# Patient Record
(demographics unavailable — no encounter records)

---

## 2024-12-03 NOTE — REASON FOR VISIT
[Annual Wellness Visit] : an annual wellness visit [FreeTextEntry1] : Annual wellness for 90 y/o male patient

## 2024-12-03 NOTE — HEALTH RISK ASSESSMENT
[Good] : ~his/her~  mood as  good [1 or 2 (0 pts)] : 1 or 2 (0 points) [Never (0 pts)] : Never (0 points) [No] : In the past 12 months have you used drugs other than those required for medical reasons? No [No falls in past year] : Patient reported no falls in the past year [Little interest or pleasure doing things] : 1) Little interest or pleasure doing things [Feeling down, depressed, or hopeless] : 2) Feeling down, depressed, or hopeless [0] : 2) Feeling down, depressed, or hopeless: Not at all (0) [PHQ-2 Negative - No further assessment needed] : PHQ-2 Negative - No further assessment needed [I have developed a follow-up plan documented below in the note.] : I have developed a follow-up plan documented below in the note. [Former] : Former [Patient declined colonoscopy] : Patient declined colonoscopy [None] : None [With Significant Other] : lives with significant other [Retired] : retired [] :  [# Of Children ___] : has [unfilled] children [Feels Safe at Home] : Feels safe at home [Fully functional (bathing, dressing, toileting, transferring, walking, feeding)] : Fully functional (bathing, dressing, toileting, transferring, walking, feeding) [Fully functional (using the telephone, shopping, preparing meals, housekeeping, doing laundry, using] : Fully functional and needs no help or supervision to perform IADLs (using the telephone, shopping, preparing meals, housekeeping, doing laundry, using transportation, managing medications and managing finances) [Reports normal functional visual acuity (ie: able to read med bottle)] : Reports normal functional visual acuity [Smoke Detector] : smoke detector [Carbon Monoxide Detector] : carbon monoxide detector [Safety elements used in home] : safety elements used in home [Seat Belt] :  uses seat belt [Sunscreen] : uses sunscreen [With Patient/Caregiver] : , with patient/caregiver [Reviewed no changes] : Reviewed, no changes [Designated Healthcare Proxy] : Designated healthcare proxy [Name: ___] : Health Care Proxy's Name: [unfilled]  [Relationship: ___] : Relationship: [unfilled] [Aggressive treatment] : aggressive treatment [I will adhere to the patient's wishes.] : I will adhere to the patient's wishes. [Time Spent: ___ minutes] : Time Spent: [unfilled] minutes [Intercurrent hospitalizations] : was admitted to the hospital  [Time Spent: ___ Minutes] : I spent [unfilled] minutes performing a depression screening for this patient. [NO] : No [de-identified] : In August he was admitted since he needed a aortic valve. He had an 80% blockage and was told he needed a stent. Ended up getting covid while at the hospital.  [de-identified] : Cardiac [Audit-CScore] : 0 [de-identified] : Walking [de-identified] : Standard diet [de-identified] :  Time Spent: Greater than 5 minutes [[id number]] : 0 [Change in mental status noted] : No change in mental status noted [Language] : denies difficulty with language [Behavior] : denies difficulty with behavior [Learning/Retaining New Information] : denies difficulty learning/retaining new information [Handling Complex Tasks] : denies difficulty handling complex tasks [Reasoning] : denies difficulty with reasoning [Spatial Ability and Orientation] : denies difficulty with spatial ability and orientation [Sexually Active] : not sexually active [Reports changes in hearing] : Reports no changes in hearing [Reports changes in vision] : Reports no changes in vision [Reports changes in dental health] : Reports no changes in dental health [Travel to Developing Areas] : does not  travel to developing areas [TB Exposure] : is not being exposed to tuberculosis [Caregiver Concerns] : does not have caregiver concerns [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

## 2024-12-03 NOTE — PHYSICAL EXAM
[No Acute Distress] : no acute distress [Well Nourished] : well nourished [Well Developed] : well developed [Well-Appearing] : well-appearing [Normal Sclera/Conjunctiva] : normal sclera/conjunctiva [PERRL] : pupils equal round and reactive to light [EOMI] : extraocular movements intact [Normal Outer Ear/Nose] : the outer ears and nose were normal in appearance [Normal Oropharynx] : the oropharynx was normal [No JVD] : no jugular venous distention [No Lymphadenopathy] : no lymphadenopathy [Supple] : supple [Thyroid Normal, No Nodules] : the thyroid was normal and there were no nodules present [No Respiratory Distress] : no respiratory distress  [No Accessory Muscle Use] : no accessory muscle use [Clear to Auscultation] : lungs were clear to auscultation bilaterally [Normal Rate] : normal rate  [Regular Rhythm] : with a regular rhythm [Normal S1, S2] : normal S1 and S2 [No Murmur] : no murmur heard [No Carotid Bruits] : no carotid bruits [No Abdominal Bruit] : a ~M bruit was not heard ~T in the abdomen [No Varicosities] : no varicosities [Pedal Pulses Present] : the pedal pulses are present [No Palpable Aorta] : no palpable aorta [No Extremity Clubbing/Cyanosis] : no extremity clubbing/cyanosis [Soft] : abdomen soft [Non Tender] : non-tender [Non-distended] : non-distended [No Masses] : no abdominal mass palpated [No HSM] : no HSM [Normal Bowel Sounds] : normal bowel sounds [Declined Rectal Exam] : declined rectal exam [Normal Posterior Cervical Nodes] : no posterior cervical lymphadenopathy [Normal Anterior Cervical Nodes] : no anterior cervical lymphadenopathy [No CVA Tenderness] : no CVA  tenderness [No Spinal Tenderness] : no spinal tenderness [No Joint Swelling] : no joint swelling [Grossly Normal Strength/Tone] : grossly normal strength/tone [No Rash] : no rash [Coordination Grossly Intact] : coordination grossly intact [No Focal Deficits] : no focal deficits [Normal Gait] : normal gait [Deep Tendon Reflexes (DTR)] : deep tendon reflexes were 2+ and symmetric [Normal Affect] : the affect was normal [Normal Insight/Judgement] : insight and judgment were intact [Normal] : affect was normal and insight and judgment were intact [de-identified] : BMI 34.8 [de-identified] : Edema lower extremities, chronic stasis dermatitis lower extremities [de-identified] : No flank pain [de-identified] : Swollen hematoma left hip thigh area [de-identified] : LLE: stasis dermatitis with focal areas of ulceration and overgrowth of keratosis, NVI

## 2024-12-03 NOTE — HISTORY OF PRESENT ILLNESS
[FreeTextEntry1] : Annual wellness. General health maintenance for this pleasant 91-year-old male with history of aortic stenosis arthritis cardiac pacemaker glucose intolerance hypertensive cardiovascular disease hyperlipidemia hypertension.  Patient underwent cardiac stent and TAVR last summer doing well.  Denies chest pain shortness of breath.  2 weeks ago patient fell getting out of his car no loss of consciousness he tripped developed hematoma in the left upper thigh hip area patient is walking without a limp and without pain.  Still has a black and blue slick and a hematoma which is resolving according to him. [de-identified] : 90 y/o M presents for an annual wellness Hx of arthritis, aortic stenosis, pacemaker, lipids, glucose intol, htn, mult. medical conditions Last saw cardiologist- Nov 9th--> Trey Montejo He denies fever chills cough chest pain or shortness of breath he is up-to-date on his vaccinations. Voices no further complaints medications include-amlodipine 10 mg aspirin 81 atorvastatin 40 Plavix 75 losartan 100 metoprolol 25 ER ROS as noted below Denies fever, cough, chills, body aches and SOB.

## 2024-12-03 NOTE — HEALTH RISK ASSESSMENT
[Good] : ~his/her~  mood as  good [1 or 2 (0 pts)] : 1 or 2 (0 points) [Never (0 pts)] : Never (0 points) [No] : In the past 12 months have you used drugs other than those required for medical reasons? No [No falls in past year] : Patient reported no falls in the past year [Little interest or pleasure doing things] : 1) Little interest or pleasure doing things [Feeling down, depressed, or hopeless] : 2) Feeling down, depressed, or hopeless [0] : 2) Feeling down, depressed, or hopeless: Not at all (0) [PHQ-2 Negative - No further assessment needed] : PHQ-2 Negative - No further assessment needed [I have developed a follow-up plan documented below in the note.] : I have developed a follow-up plan documented below in the note. [Former] : Former [Patient declined colonoscopy] : Patient declined colonoscopy [None] : None [With Significant Other] : lives with significant other [Retired] : retired [] :  [# Of Children ___] : has [unfilled] children [Feels Safe at Home] : Feels safe at home [Fully functional (bathing, dressing, toileting, transferring, walking, feeding)] : Fully functional (bathing, dressing, toileting, transferring, walking, feeding) [Fully functional (using the telephone, shopping, preparing meals, housekeeping, doing laundry, using] : Fully functional and needs no help or supervision to perform IADLs (using the telephone, shopping, preparing meals, housekeeping, doing laundry, using transportation, managing medications and managing finances) [Reports normal functional visual acuity (ie: able to read med bottle)] : Reports normal functional visual acuity [Smoke Detector] : smoke detector [Carbon Monoxide Detector] : carbon monoxide detector [Safety elements used in home] : safety elements used in home [Seat Belt] :  uses seat belt [Sunscreen] : uses sunscreen [With Patient/Caregiver] : , with patient/caregiver [Reviewed no changes] : Reviewed, no changes [Designated Healthcare Proxy] : Designated healthcare proxy [Name: ___] : Health Care Proxy's Name: [unfilled]  [Relationship: ___] : Relationship: [unfilled] [Aggressive treatment] : aggressive treatment [I will adhere to the patient's wishes.] : I will adhere to the patient's wishes. [Time Spent: ___ minutes] : Time Spent: [unfilled] minutes [Intercurrent hospitalizations] : was admitted to the hospital  [Time Spent: ___ Minutes] : I spent [unfilled] minutes performing a depression screening for this patient. [NO] : No [de-identified] : In August he was admitted since he needed a aortic valve. He had an 80% blockage and was told he needed a stent. Ended up getting covid while at the hospital.  [de-identified] : Cardiac [Audit-CScore] : 0 [de-identified] : Walking [de-identified] : Standard diet [de-identified] :  Time Spent: Greater than 5 minutes [[id number]] : 0 [Change in mental status noted] : No change in mental status noted [Language] : denies difficulty with language [Behavior] : denies difficulty with behavior [Learning/Retaining New Information] : denies difficulty learning/retaining new information [Handling Complex Tasks] : denies difficulty handling complex tasks [Reasoning] : denies difficulty with reasoning [Spatial Ability and Orientation] : denies difficulty with spatial ability and orientation [Sexually Active] : not sexually active [Reports changes in hearing] : Reports no changes in hearing [Reports changes in vision] : Reports no changes in vision [Reports changes in dental health] : Reports no changes in dental health [Travel to Developing Areas] : does not  travel to developing areas [TB Exposure] : is not being exposed to tuberculosis [Caregiver Concerns] : does not have caregiver concerns [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

## 2024-12-03 NOTE — PHYSICAL EXAM
[No Acute Distress] : no acute distress [Well Nourished] : well nourished [Well Developed] : well developed [Well-Appearing] : well-appearing [Normal Sclera/Conjunctiva] : normal sclera/conjunctiva [PERRL] : pupils equal round and reactive to light [EOMI] : extraocular movements intact [Normal Outer Ear/Nose] : the outer ears and nose were normal in appearance [Normal Oropharynx] : the oropharynx was normal [No JVD] : no jugular venous distention [No Lymphadenopathy] : no lymphadenopathy [Supple] : supple [Thyroid Normal, No Nodules] : the thyroid was normal and there were no nodules present [No Respiratory Distress] : no respiratory distress  [No Accessory Muscle Use] : no accessory muscle use [Clear to Auscultation] : lungs were clear to auscultation bilaterally [Normal Rate] : normal rate  [Regular Rhythm] : with a regular rhythm [Normal S1, S2] : normal S1 and S2 [No Murmur] : no murmur heard [No Carotid Bruits] : no carotid bruits [No Abdominal Bruit] : a ~M bruit was not heard ~T in the abdomen [No Varicosities] : no varicosities [Pedal Pulses Present] : the pedal pulses are present [No Palpable Aorta] : no palpable aorta [No Extremity Clubbing/Cyanosis] : no extremity clubbing/cyanosis [Soft] : abdomen soft [Non Tender] : non-tender [Non-distended] : non-distended [No Masses] : no abdominal mass palpated [No HSM] : no HSM [Normal Bowel Sounds] : normal bowel sounds [Declined Rectal Exam] : declined rectal exam [Normal Posterior Cervical Nodes] : no posterior cervical lymphadenopathy [Normal Anterior Cervical Nodes] : no anterior cervical lymphadenopathy [No CVA Tenderness] : no CVA  tenderness [No Spinal Tenderness] : no spinal tenderness [No Joint Swelling] : no joint swelling [Grossly Normal Strength/Tone] : grossly normal strength/tone [No Rash] : no rash [Coordination Grossly Intact] : coordination grossly intact [No Focal Deficits] : no focal deficits [Normal Gait] : normal gait [Deep Tendon Reflexes (DTR)] : deep tendon reflexes were 2+ and symmetric [Normal Affect] : the affect was normal [Normal Insight/Judgement] : insight and judgment were intact [Normal] : affect was normal and insight and judgment were intact [de-identified] : BMI 34.8 [de-identified] : Edema lower extremities, chronic stasis dermatitis lower extremities [de-identified] : No flank pain [de-identified] : Swollen hematoma left hip thigh area [de-identified] : LLE: stasis dermatitis with focal areas of ulceration and overgrowth of keratosis, NVI

## 2024-12-03 NOTE — REVIEW OF SYSTEMS
[Negative] : Heme/Lymph [FreeTextEntry2] : Status post fall [FreeTextEntry9] : Hematoma left hip area, black and blue tender swelling [de-identified] : Stasis dermatitis lower extremities secondary to venous insufficiency

## 2024-12-03 NOTE — HISTORY OF PRESENT ILLNESS
[FreeTextEntry1] : Annual wellness. General health maintenance for this pleasant 91-year-old male with history of aortic stenosis arthritis cardiac pacemaker glucose intolerance hypertensive cardiovascular disease hyperlipidemia hypertension.  Patient underwent cardiac stent and TAVR last summer doing well.  Denies chest pain shortness of breath.  2 weeks ago patient fell getting out of his car no loss of consciousness he tripped developed hematoma in the left upper thigh hip area patient is walking without a limp and without pain.  Still has a black and blue slick and a hematoma which is resolving according to him. [de-identified] : 90 y/o M presents for an annual wellness Hx of arthritis, aortic stenosis, pacemaker, lipids, glucose intol, htn, mult. medical conditions Last saw cardiologist- Nov 9th--> Trey Montejo He denies fever chills cough chest pain or shortness of breath he is up-to-date on his vaccinations. Voices no further complaints medications include-amlodipine 10 mg aspirin 81 atorvastatin 40 Plavix 75 losartan 100 metoprolol 25 ER ROS as noted below Denies fever, cough, chills, body aches and SOB.

## 2024-12-03 NOTE — COUNSELING
[Fall prevention counseling provided] : Fall prevention counseling provided [Adequate lighting] : Adequate lighting [No throw rugs] : No throw rugs [Use proper foot wear] : Use proper foot wear [Use recommended devices] : Use recommended devices [Behavioral health counseling provided] : Behavioral health counseling provided [Sleep ___ hours/day] : Sleep [unfilled] hours/day [Engage in a relaxing activity] : Engage in a relaxing activity [Plan in advance] : Plan in advance [Potential consequences of obesity discussed] : Potential consequences of obesity discussed [Benefits of weight loss discussed] : Benefits of weight loss discussed [Structured Weight Management Program suggested:] : Structured weight management program suggested [Encouraged to maintain food diary] : Encouraged to maintain food diary [Encouraged to increase physical activity] : Encouraged to increase physical activity [Encouraged to use exercise tracking device] : Encouraged to use exercise tracking device [Target Wt Loss Goal ___] : Weight Loss Goals: Target weight loss goal [unfilled] lbs [Weigh Self Weekly] : weigh self weekly [Decrease Portions] : decrease portions [____ min/wk Activity] : [unfilled] min/wk activity [Keep Food Diary] : keep food diary [None] : None [Good understanding] : Patient has a good understanding of lifestyle changes and steps needed to achieve self management goal [FreeTextEntry1] : Low-fat low-carb Bypro [FreeTextEntry4] : 15 [de-identified] : Total face-to-face time with patient - 20 minutes; >50% involved counselling, review of labs/tests, and/or coordination of medical care:  Medical Annual wellness visit completed: HRA completed and reviewed with patient Medical, family, surgical history reviewed with patient and updated List of current providers r/w patient and updated Vitals, BMI reviewed and discussed along with healthy BMI goals. Dietary counseling x 15 minutes provided Depression PHQ 9 completed and reviewed  Annual safety assessment reviewed discussed advanced directives smoking cessation counseling provided Established routine screening and immunization schedules  VACCINATION & OTHER TX RECOMMENDATIONS  ASA preventative therapy Calcium/Vitamin D supplementation   Dietary counseling, nutrition referral risks vs. benefits d/w patient. routine vaccination and vaccination schedules and recommendation d/w patient  Vaccines recommended:  * pneumovax (once after 65) & prevnar * annual Influenza vaccine * Hep B vaccines * zostavax * Tdap  Colorectal screening recommended; screening colonoscopy q10yr, flex sig q5yr, annual fecal occult testing BMD recommended biennially for osteoporosis screening Glaucoma screening recommended, annual optho evals Cardiovascular screening and blood tests recommended and discussed w/ patient, cholesterol screening and dietary counseling AAA recommended x 1  diet and exercise weight loss.  Low-salt low-fat ADA diet/ htn- Discussed diabetes physiology - Discussed importance of monitoring blood glucose levels - Encouraged a low fat/low cholesterol diet - Discussed symptoms of hyperglycemia and hypoglycemia - Discussed ADA glucose goals - Discussed  HGB A1c and the effects of blood glucose on the level - Discussed Healthy eating, avoidance of concentrated sweets, and to include vegetables by at least 2 meals a day - Discussed regular exercise - Discussed importance of follow up physician visits Limit intake of Sodium (Salt) to less than 2 grams a day to prevent fluid retention-swelling or worsening of symptoms. The importance of keeping the blood pressure at or below 130/80 to prevent stroke, heart attacks, kidney failure, blindness, and loss of limbs was  low chol diet. Avoid fried foods, red meat, butter, eggs, hard cheeses. Use canola or olive oil preferred. ::  was established in which goals would be set, monitoring would be done, and problem solving would also be addressed. The patient would be assisted using behavior change techniques, such as self-help and counseling through behavioral modification: Problem solving using hypnosis and positive medical reinforcement to achieve agreed-upon goals.

## 2024-12-03 NOTE — END OF VISIT
[FreeTextEntry4] : I Deborah Wagner, am scribing for and in the presence of Dr. Bazan, the following sections of:  HISTORY OF PRESENT ILLNESS, PAST MEDICAL/FAMILY/SOCIAL HISTORY, ROS, VITALS, PE, DISPOSITION

## 2024-12-03 NOTE — REVIEW OF SYSTEMS
[Negative] : Heme/Lymph [FreeTextEntry2] : Status post fall [FreeTextEntry9] : Hematoma left hip area, black and blue tender swelling [de-identified] : Stasis dermatitis lower extremities secondary to venous insufficiency

## 2024-12-03 NOTE — COUNSELING
[Fall prevention counseling provided] : Fall prevention counseling provided [Adequate lighting] : Adequate lighting [No throw rugs] : No throw rugs [Use proper foot wear] : Use proper foot wear [Use recommended devices] : Use recommended devices [Behavioral health counseling provided] : Behavioral health counseling provided [Sleep ___ hours/day] : Sleep [unfilled] hours/day [Engage in a relaxing activity] : Engage in a relaxing activity [Plan in advance] : Plan in advance [Potential consequences of obesity discussed] : Potential consequences of obesity discussed [Benefits of weight loss discussed] : Benefits of weight loss discussed [Structured Weight Management Program suggested:] : Structured weight management program suggested [Encouraged to maintain food diary] : Encouraged to maintain food diary [Encouraged to increase physical activity] : Encouraged to increase physical activity [Encouraged to use exercise tracking device] : Encouraged to use exercise tracking device [Target Wt Loss Goal ___] : Weight Loss Goals: Target weight loss goal [unfilled] lbs [Weigh Self Weekly] : weigh self weekly [Decrease Portions] : decrease portions [____ min/wk Activity] : [unfilled] min/wk activity [Keep Food Diary] : keep food diary [None] : None [Good understanding] : Patient has a good understanding of lifestyle changes and steps needed to achieve self management goal [FreeTextEntry1] : Low-fat low-carb Bypro [FreeTextEntry4] : 15 [de-identified] : Total face-to-face time with patient - 20 minutes; >50% involved counselling, review of labs/tests, and/or coordination of medical care:  Medical Annual wellness visit completed: HRA completed and reviewed with patient Medical, family, surgical history reviewed with patient and updated List of current providers r/w patient and updated Vitals, BMI reviewed and discussed along with healthy BMI goals. Dietary counseling x 15 minutes provided Depression PHQ 9 completed and reviewed  Annual safety assessment reviewed discussed advanced directives smoking cessation counseling provided Established routine screening and immunization schedules  VACCINATION & OTHER TX RECOMMENDATIONS  ASA preventative therapy Calcium/Vitamin D supplementation   Dietary counseling, nutrition referral risks vs. benefits d/w patient. routine vaccination and vaccination schedules and recommendation d/w patient  Vaccines recommended:  * pneumovax (once after 65) & prevnar * annual Influenza vaccine * Hep B vaccines * zostavax * Tdap  Colorectal screening recommended; screening colonoscopy q10yr, flex sig q5yr, annual fecal occult testing BMD recommended biennially for osteoporosis screening Glaucoma screening recommended, annual optho evals Cardiovascular screening and blood tests recommended and discussed w/ patient, cholesterol screening and dietary counseling AAA recommended x 1  diet and exercise weight loss.  Low-salt low-fat ADA diet/ htn- Discussed diabetes physiology - Discussed importance of monitoring blood glucose levels - Encouraged a low fat/low cholesterol diet - Discussed symptoms of hyperglycemia and hypoglycemia - Discussed ADA glucose goals - Discussed  HGB A1c and the effects of blood glucose on the level - Discussed Healthy eating, avoidance of concentrated sweets, and to include vegetables by at least 2 meals a day - Discussed regular exercise - Discussed importance of follow up physician visits Limit intake of Sodium (Salt) to less than 2 grams a day to prevent fluid retention-swelling or worsening of symptoms. The importance of keeping the blood pressure at or below 130/80 to prevent stroke, heart attacks, kidney failure, blindness, and loss of limbs was  low chol diet. Avoid fried foods, red meat, butter, eggs, hard cheeses. Use canola or olive oil preferred. ::  was established in which goals would be set, monitoring would be done, and problem solving would also be addressed. The patient would be assisted using behavior change techniques, such as self-help and counseling through behavioral modification: Problem solving using hypnosis and positive medical reinforcement to achieve agreed-upon goals.

## 2025-07-22 NOTE — PHYSICAL EXAM
[No Acute Distress] : no acute distress [Well Nourished] : well nourished [Well Developed] : well developed [Well-Appearing] : well-appearing [Normal Sclera/Conjunctiva] : normal sclera/conjunctiva [PERRL] : pupils equal round and reactive to light [EOMI] : extraocular movements intact [Normal Outer Ear/Nose] : the outer ears and nose were normal in appearance [Normal Oropharynx] : the oropharynx was normal [No JVD] : no jugular venous distention [No Lymphadenopathy] : no lymphadenopathy [Supple] : supple [Thyroid Normal, No Nodules] : the thyroid was normal and there were no nodules present [No Respiratory Distress] : no respiratory distress  [No Accessory Muscle Use] : no accessory muscle use [Clear to Auscultation] : lungs were clear to auscultation bilaterally [Normal Rate] : normal rate  [Regular Rhythm] : with a regular rhythm [Normal S1, S2] : normal S1 and S2 [No Murmur] : no murmur heard [No Carotid Bruits] : no carotid bruits [No Abdominal Bruit] : a ~M bruit was not heard ~T in the abdomen [No Varicosities] : no varicosities [Pedal Pulses Present] : the pedal pulses are present [No Edema] : there was no peripheral edema [No Palpable Aorta] : no palpable aorta [No Extremity Clubbing/Cyanosis] : no extremity clubbing/cyanosis [Soft] : abdomen soft [Non Tender] : non-tender [Non-distended] : non-distended [No Masses] : no abdominal mass palpated [No HSM] : no HSM [Normal Bowel Sounds] : normal bowel sounds [Normal Posterior Cervical Nodes] : no posterior cervical lymphadenopathy [Normal Anterior Cervical Nodes] : no anterior cervical lymphadenopathy [No CVA Tenderness] : no CVA  tenderness [No Spinal Tenderness] : no spinal tenderness [No Joint Swelling] : no joint swelling [Grossly Normal Strength/Tone] : grossly normal strength/tone [No Rash] : no rash [Coordination Grossly Intact] : coordination grossly intact [No Focal Deficits] : no focal deficits [Normal Gait] : normal gait [Deep Tendon Reflexes (DTR)] : deep tendon reflexes were 2+ and symmetric [Normal Affect] : the affect was normal [Normal Insight/Judgement] : insight and judgment were intact [Normal Appearance] : normal in appearance [Declined Rectal Exam] : declined rectal exam [Normal] : affect was normal and insight and judgment were intact [de-identified] : Patient's weight is 216 he is lost 13 pounds BMI 32.8 [de-identified] : Decreased vision [de-identified] : Decreased hearing [de-identified] : No flank pain [de-identified] : Patient appears to have a mild scoliosis with a dextroscoliosis/ [de-identified] : Right shoulder bleeding to the right.  Right clavicle appears to be stuck.  No pain elicited with palpation

## 2025-07-22 NOTE — REVIEW OF SYSTEMS
[Negative] : Heme/Lymph [Vision Problems] : vision problems [Hearing Loss] : hearing loss [FreeTextEntry9] : Patient feels like his right shoulder is dipping lower than his left [de-identified] : Xerosis multiple skin tears blotches and bruising arms and legs

## 2025-07-22 NOTE — HEALTH RISK ASSESSMENT
[Fair] : ~his/her~ current health as fair  [Good] : ~his/her~  mood as  good [No] : No [No falls in past year] : Patient reported no falls in the past year [0] : 2) Feeling down, depressed, or hopeless: Not at all (0) [PHQ-2 Negative - No further assessment needed] : PHQ-2 Negative - No further assessment needed [Never] : Never [With Significant Other] : lives with significant other [Retired] : retired [Graduate School] : graduate school [] :  [# Of Children ___] : has [unfilled] children [Fully functional (bathing, dressing, toileting, transferring, walking, feeding)] : Fully functional (bathing, dressing, toileting, transferring, walking, feeding) [Fully functional (using the telephone, shopping, preparing meals, housekeeping, doing laundry, using] : Fully functional and needs no help or supervision to perform IADLs (using the telephone, shopping, preparing meals, housekeeping, doing laundry, using transportation, managing medications and managing finances) [Reviewed no changes] : Reviewed, no changes [Aggressive treatment] : aggressive treatment [de-identified] : Dermatologist cardiologist [de-identified] : Minimal [de-identified] : Standard [Change in mental status noted] : No change in mental status noted [Language] : denies difficulty with language [Behavior] : denies difficulty with behavior [Learning/Retaining New Information] : denies difficulty learning/retaining new information [Handling Complex Tasks] : denies difficulty handling complex tasks [Reasoning] : denies difficulty with reasoning [Spatial Ability and Orientation] : denies difficulty with spatial ability and orientation [Sexually Active] : not sexually active [Reports changes in hearing] : Reports no changes in hearing [Reports changes in vision] : Reports no changes in vision [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

## 2025-07-22 NOTE — PHYSICAL EXAM
[No Acute Distress] : no acute distress [Well Nourished] : well nourished [Well Developed] : well developed [Well-Appearing] : well-appearing [Normal Sclera/Conjunctiva] : normal sclera/conjunctiva [PERRL] : pupils equal round and reactive to light [EOMI] : extraocular movements intact [Normal Outer Ear/Nose] : the outer ears and nose were normal in appearance [Normal Oropharynx] : the oropharynx was normal [No JVD] : no jugular venous distention [No Lymphadenopathy] : no lymphadenopathy [Supple] : supple [Thyroid Normal, No Nodules] : the thyroid was normal and there were no nodules present [No Respiratory Distress] : no respiratory distress  [No Accessory Muscle Use] : no accessory muscle use [Clear to Auscultation] : lungs were clear to auscultation bilaterally [Normal Rate] : normal rate  [Regular Rhythm] : with a regular rhythm [Normal S1, S2] : normal S1 and S2 [No Murmur] : no murmur heard [No Carotid Bruits] : no carotid bruits [No Abdominal Bruit] : a ~M bruit was not heard ~T in the abdomen [No Varicosities] : no varicosities [Pedal Pulses Present] : the pedal pulses are present [No Edema] : there was no peripheral edema [No Palpable Aorta] : no palpable aorta [No Extremity Clubbing/Cyanosis] : no extremity clubbing/cyanosis [Soft] : abdomen soft [Non Tender] : non-tender [Non-distended] : non-distended [No Masses] : no abdominal mass palpated [No HSM] : no HSM [Normal Bowel Sounds] : normal bowel sounds [Normal Posterior Cervical Nodes] : no posterior cervical lymphadenopathy [Normal Anterior Cervical Nodes] : no anterior cervical lymphadenopathy [No CVA Tenderness] : no CVA  tenderness [No Spinal Tenderness] : no spinal tenderness [No Joint Swelling] : no joint swelling [Grossly Normal Strength/Tone] : grossly normal strength/tone [No Rash] : no rash [Coordination Grossly Intact] : coordination grossly intact [No Focal Deficits] : no focal deficits [Normal Gait] : normal gait [Deep Tendon Reflexes (DTR)] : deep tendon reflexes were 2+ and symmetric [Normal Affect] : the affect was normal [Normal Insight/Judgement] : insight and judgment were intact [Normal Appearance] : normal in appearance [Declined Rectal Exam] : declined rectal exam [Normal] : affect was normal and insight and judgment were intact [de-identified] : Patient's weight is 216 he is lost 13 pounds BMI 32.8 [de-identified] : Decreased vision [de-identified] : Decreased hearing [de-identified] : No flank pain [de-identified] : Patient appears to have a mild scoliosis with a dextroscoliosis/ [de-identified] : Right shoulder bleeding to the right.  Right clavicle appears to be stuck.  No pain elicited with palpation

## 2025-07-22 NOTE — HISTORY OF PRESENT ILLNESS
[FreeTextEntry1] : Follow up and a GHM. Hx of arthritis, aortic stenosis, pacemaker, lipids, glucose intol, htn, mult. medical conditions. CC: Patient is here today for a routine follow up and because he has been noticing that one side of chest/shoulder is sinking in more than the other one.  Patient denies pain in the shoulder or the spine.  Patient walks with a cane denies falls..  Patient has had..  Multiple skin tears and has had a recent biopsy of his left shin which is not healing very quickly. [de-identified] : 91 year old male presents himself today for a F/U and GHM.  Hx of arthritis, aortic stenosis, pacemaker, lipids, glucose intol, htn, mult. medical conditions Patient denies fever chills cough chest pain or shortness of breath.  Medications include amlodipine aspirin a atorvastatin Plavix losartan metoprolol Patient is here today for a routine follow up  and because he has been noticing that one side of chest/shoulder is sinking in than the other one.  Last saw cardiologist- Nov 9th--> Trey Montejo Otherwise patient is stable.  Voices no further complaints ROS as noted below Denies fever, cough, chills, body aches and SOB.  I Cherelle Martinez, am scribing for and in the presence of Dr. Bazan, the following sections of:  HISTORY OF PRESENT ILLNESS, PAST MEDICAL/FAMILY/SOCIAL HISTORY, ROS, VITALS, PE, DISPOSITION

## 2025-07-22 NOTE — HISTORY OF PRESENT ILLNESS
[FreeTextEntry1] : Follow up and a GHM. Hx of arthritis, aortic stenosis, pacemaker, lipids, glucose intol, htn, mult. medical conditions. CC: Patient is here today for a routine follow up and because he has been noticing that one side of chest/shoulder is sinking in more than the other one.  Patient denies pain in the shoulder or the spine.  Patient walks with a cane denies falls..  Patient has had..  Multiple skin tears and has had a recent biopsy of his left shin which is not healing very quickly. [de-identified] : 91 year old male presents himself today for a F/U and GHM.  Hx of arthritis, aortic stenosis, pacemaker, lipids, glucose intol, htn, mult. medical conditions Patient denies fever chills cough chest pain or shortness of breath.  Medications include amlodipine aspirin a atorvastatin Plavix losartan metoprolol Patient is here today for a routine follow up  and because he has been noticing that one side of chest/shoulder is sinking in than the other one.  Last saw cardiologist- Nov 9th--> Trey Montejo Otherwise patient is stable.  Voices no further complaints ROS as noted below Denies fever, cough, chills, body aches and SOB.  I Cherelle Martinez, am scribing for and in the presence of Dr. Bazan, the following sections of:  HISTORY OF PRESENT ILLNESS, PAST MEDICAL/FAMILY/SOCIAL HISTORY, ROS, VITALS, PE, DISPOSITION

## 2025-07-22 NOTE — REVIEW OF SYSTEMS
[Negative] : Heme/Lymph [Vision Problems] : vision problems [Hearing Loss] : hearing loss [FreeTextEntry9] : Patient feels like his right shoulder is dipping lower than his left [de-identified] : Xerosis multiple skin tears blotches and bruising arms and legs

## 2025-07-22 NOTE — COUNSELING
[Fall prevention counseling provided] : Fall prevention counseling provided [Adequate lighting] : Adequate lighting [No throw rugs] : No throw rugs [Use proper foot wear] : Use proper foot wear [Behavioral health counseling provided] : Behavioral health counseling provided [Sleep ___ hours/day] : Sleep [unfilled] hours/day [Engage in a relaxing activity] : Engage in a relaxing activity [Potential consequences of obesity discussed] : Potential consequences of obesity discussed [Benefits of weight loss discussed] : Benefits of weight loss discussed [Structured Weight Management Program suggested:] : Structured weight management program suggested [Encouraged to maintain food diary] : Encouraged to maintain food diary [Encouraged to increase physical activity] : Encouraged to increase physical activity [Encouraged to use exercise tracking device] : Encouraged to use exercise tracking device [Target Wt Loss Goal ___] : Weight Loss Goals: Target weight loss goal [unfilled] lbs [Weigh Self Weekly] : weigh self weekly [Decrease Portions] : decrease portions [____ min/wk Activity] : [unfilled] min/wk activity [Keep Food Diary] : keep food diary [None] : None [Good understanding] : Patient has a good understanding of lifestyle changes and steps needed to achieve self management goal [FreeTextEntry2] : Low-fat low-carb high-protein [de-identified] : Total face-to-face time with patient - _10_ minutes; >50% involved counselling, review of labs/tests, and/or coordination of medical care: take medications as advised. Rest, ice/heat massage therapy/myofascial  release no heavy lift or twisting avoid prolonged positioning. We reviewed proper lifting mechanics continue with a home stretching program. Return to office in 2 weeks diet and exercise weight loss.  Low-salt low-fat ADA diet/ htn- Discussed diabetes physiology - Discussed importance of monitoring blood glucose levels - Encouraged a low fat/low cholesterol diet - Discussed symptoms of hyperglycemia and hypoglycemia - Discussed ADA glucose goals - Discussed  HGB A1c and the effects of blood glucose on the level - Discussed Healthy eating, avoidance of concentrated sweets, and to include vegetables by at least 2 meals a day - Discussed regular exercise - Discussed importance of follow up physician visits Limit intake of Sodium (Salt) to less than 2 grams a day to prevent fluid retention-swelling or worsening of symptoms. The importance of keeping the blood pressure at or below 130/80 to prevent stroke, heart attacks, kidney failure, blindness, and loss of limbs was  low chol diet. Avoid fried foods, red meat, butter, eggs, hard cheeses. Use canola or olive oil preferred. ::  was established in which goals would be set, monitoring would be done, and problem solving would also be addressed. The patient would be assisted using behavior change techniques, such as self-help and counseling through behavioral modification: Problem solving using hypnosis and positive medical reinforcement to achieve agreed-upon goals. - Discussed diabetes physiology - Discussed importance of monitoring blood glucose levels - Encouraged a low fat/low cholesterol diet - Discussed symptoms of hyperglycemia and hypoglycemia - Discussed ADA glucose goals - Discussed  HGB A1c and the effects of blood glucose on the level - Discussed Healthy eating, avoidance of concentrated sweets, and to include vegetables by at least 2 meals a day - Discussed regular exercise - Discussed importance of follow up physician visits Bp stable, continue with medications, dash diet, exercise, and dietary management.Continue to check home Bps.  1. Make an appointment with your doctor Begin your fall-prevention plan by making an appointment with your doctor. Be prepared to answer questions such as:  What medications are you taking? Make a list of your prescription and over-the-counter medications and supplements, or bring them with you to the appointment. Your doctor can review your medications for side effects and interactions that may increase your risk of falling. To help with fall prevention, your doctor may consider weaning you off medications that make you tired or affect your thinking, such as sedatives and some types of antidepressants. Have you fallen before? Write down the details, including when, where and how you fell. Be prepared to discuss instances when you almost fell but were caught by someone or managed to grab hold of something just in time. Details such as these may help your doctor identify specific fall-prevention strategies. Could your health conditions cause a fall? Certain eye and ear disorders may increase your risk of falls. Be prepared to discuss your health conditions and how comfortable you are when you walk - for example, do you feel any dizziness, joint pain, shortness of breath, or numbness in your feet and legs when you walk? Your doctor may evaluate your muscle strength, balance and walking style (gait) as well. 2. Keep moving Physical activity can go a long way toward fall prevention. With your doctor's OK, consider activities such as walking, water workouts or martha chi - a gentle exercise that involves slow and graceful dance-like movements. Such activities reduce the risk of falls by improving strength, balance, coordination and flexibility.  If you avoid physical activity because you're afraid it will make a fall more likely, tell your doctor. He or she may recommend carefully monitored exercise programs or refer you to a physical therapist. The physical therapist can create a custom exercise program aimed at improving your balance, flexibility, muscle strength and gait.  3. Wear sensible shoes Consider changing your footwear as part of your fall-prevention plan. High heels, floppy slippers and shoes with slick soles can make you slip, stumble and fall. So can walking in your stocking feet. Instead, wear properly fitting, sturdy shoes with nonskid soles. Sensible shoes may also reduce joint pain.  4. Remove home hazards Take a look around your home. Your living room, kitchen, bedroom, bathroom, hallways and stairways may be filled with hazards. To make your home safer:  Remove boxes, newspapers, electrical cords and phone cords from walkways. Move coffee tables, magazine racks and plant stands from high-traffic areas. Secure loose rugs with double-faced tape, tacks or a slip-resistant backing - or remove loose rugs from your home. Repair loose, wooden floorboards and carpeting right away. Store clothing, dishes, food and other necessities within easy reach. Immediately clean spilled liquids, grease or food. Use nonslip mats in your bathtub or shower. Use a bath seat, which allows you to sit while showering. 5. Light up your living space Keep your home brightly lit to avoid tripping on objects that are hard to see. Also:  Place night lights in your bedroom, bathroom and hallways. Place a lamp within reach of your bed for middle-of-the-night needs. Make clear paths to light switches that aren't near room entrances. Consider trading traditional switches for glow-in-the-dark or illuminated switches. Turn on the lights before going up or down stairs. Store flashlights in easy-to-find places in case of power outages. 6. Use assistive devices Your doctor might recommend using a cane or walker to keep you steady. Other assistive devices can help, too. For example:  Hand rails for both sides of stairways Nonslip treads for bare-wood steps A raised toilet seat or one with armrests Grab bars for the shower or tub A sturdy plastic seat for the shower or tub - plus a hand-held shower nozzle for bathing while sitting down If necessary, ask your doctor for a referral to an occupational therapist. He or she can help you brainstorm other fall-prevention strategies. Some solutions are easily installed and relatively inexpensive. Others may require professional help or a larger investment. If you're concerned about the cost, remember that an investment in fall prevention is an investment in your independence.

## 2025-07-22 NOTE — COUNSELING
[Fall prevention counseling provided] : Fall prevention counseling provided [Adequate lighting] : Adequate lighting [No throw rugs] : No throw rugs [Use proper foot wear] : Use proper foot wear [Behavioral health counseling provided] : Behavioral health counseling provided [Sleep ___ hours/day] : Sleep [unfilled] hours/day [Engage in a relaxing activity] : Engage in a relaxing activity [Potential consequences of obesity discussed] : Potential consequences of obesity discussed [Benefits of weight loss discussed] : Benefits of weight loss discussed [Structured Weight Management Program suggested:] : Structured weight management program suggested [Encouraged to maintain food diary] : Encouraged to maintain food diary [Encouraged to increase physical activity] : Encouraged to increase physical activity [Encouraged to use exercise tracking device] : Encouraged to use exercise tracking device [Target Wt Loss Goal ___] : Weight Loss Goals: Target weight loss goal [unfilled] lbs [Weigh Self Weekly] : weigh self weekly [Decrease Portions] : decrease portions [____ min/wk Activity] : [unfilled] min/wk activity [Keep Food Diary] : keep food diary [None] : None [Good understanding] : Patient has a good understanding of lifestyle changes and steps needed to achieve self management goal [FreeTextEntry2] : Low-fat low-carb high-protein [de-identified] : Total face-to-face time with patient - _10_ minutes; >50% involved counselling, review of labs/tests, and/or coordination of medical care: take medications as advised. Rest, ice/heat massage therapy/myofascial  release no heavy lift or twisting avoid prolonged positioning. We reviewed proper lifting mechanics continue with a home stretching program. Return to office in 2 weeks diet and exercise weight loss.  Low-salt low-fat ADA diet/ htn- Discussed diabetes physiology - Discussed importance of monitoring blood glucose levels - Encouraged a low fat/low cholesterol diet - Discussed symptoms of hyperglycemia and hypoglycemia - Discussed ADA glucose goals - Discussed  HGB A1c and the effects of blood glucose on the level - Discussed Healthy eating, avoidance of concentrated sweets, and to include vegetables by at least 2 meals a day - Discussed regular exercise - Discussed importance of follow up physician visits Limit intake of Sodium (Salt) to less than 2 grams a day to prevent fluid retention-swelling or worsening of symptoms. The importance of keeping the blood pressure at or below 130/80 to prevent stroke, heart attacks, kidney failure, blindness, and loss of limbs was  low chol diet. Avoid fried foods, red meat, butter, eggs, hard cheeses. Use canola or olive oil preferred. ::  was established in which goals would be set, monitoring would be done, and problem solving would also be addressed. The patient would be assisted using behavior change techniques, such as self-help and counseling through behavioral modification: Problem solving using hypnosis and positive medical reinforcement to achieve agreed-upon goals. - Discussed diabetes physiology - Discussed importance of monitoring blood glucose levels - Encouraged a low fat/low cholesterol diet - Discussed symptoms of hyperglycemia and hypoglycemia - Discussed ADA glucose goals - Discussed  HGB A1c and the effects of blood glucose on the level - Discussed Healthy eating, avoidance of concentrated sweets, and to include vegetables by at least 2 meals a day - Discussed regular exercise - Discussed importance of follow up physician visits Bp stable, continue with medications, dash diet, exercise, and dietary management.Continue to check home Bps.  1. Make an appointment with your doctor Begin your fall-prevention plan by making an appointment with your doctor. Be prepared to answer questions such as:  What medications are you taking? Make a list of your prescription and over-the-counter medications and supplements, or bring them with you to the appointment. Your doctor can review your medications for side effects and interactions that may increase your risk of falling. To help with fall prevention, your doctor may consider weaning you off medications that make you tired or affect your thinking, such as sedatives and some types of antidepressants. Have you fallen before? Write down the details, including when, where and how you fell. Be prepared to discuss instances when you almost fell but were caught by someone or managed to grab hold of something just in time. Details such as these may help your doctor identify specific fall-prevention strategies. Could your health conditions cause a fall? Certain eye and ear disorders may increase your risk of falls. Be prepared to discuss your health conditions and how comfortable you are when you walk - for example, do you feel any dizziness, joint pain, shortness of breath, or numbness in your feet and legs when you walk? Your doctor may evaluate your muscle strength, balance and walking style (gait) as well. 2. Keep moving Physical activity can go a long way toward fall prevention. With your doctor's OK, consider activities such as walking, water workouts or martha chi - a gentle exercise that involves slow and graceful dance-like movements. Such activities reduce the risk of falls by improving strength, balance, coordination and flexibility.  If you avoid physical activity because you're afraid it will make a fall more likely, tell your doctor. He or she may recommend carefully monitored exercise programs or refer you to a physical therapist. The physical therapist can create a custom exercise program aimed at improving your balance, flexibility, muscle strength and gait.  3. Wear sensible shoes Consider changing your footwear as part of your fall-prevention plan. High heels, floppy slippers and shoes with slick soles can make you slip, stumble and fall. So can walking in your stocking feet. Instead, wear properly fitting, sturdy shoes with nonskid soles. Sensible shoes may also reduce joint pain.  4. Remove home hazards Take a look around your home. Your living room, kitchen, bedroom, bathroom, hallways and stairways may be filled with hazards. To make your home safer:  Remove boxes, newspapers, electrical cords and phone cords from walkways. Move coffee tables, magazine racks and plant stands from high-traffic areas. Secure loose rugs with double-faced tape, tacks or a slip-resistant backing - or remove loose rugs from your home. Repair loose, wooden floorboards and carpeting right away. Store clothing, dishes, food and other necessities within easy reach. Immediately clean spilled liquids, grease or food. Use nonslip mats in your bathtub or shower. Use a bath seat, which allows you to sit while showering. 5. Light up your living space Keep your home brightly lit to avoid tripping on objects that are hard to see. Also:  Place night lights in your bedroom, bathroom and hallways. Place a lamp within reach of your bed for middle-of-the-night needs. Make clear paths to light switches that aren't near room entrances. Consider trading traditional switches for glow-in-the-dark or illuminated switches. Turn on the lights before going up or down stairs. Store flashlights in easy-to-find places in case of power outages. 6. Use assistive devices Your doctor might recommend using a cane or walker to keep you steady. Other assistive devices can help, too. For example:  Hand rails for both sides of stairways Nonslip treads for bare-wood steps A raised toilet seat or one with armrests Grab bars for the shower or tub A sturdy plastic seat for the shower or tub - plus a hand-held shower nozzle for bathing while sitting down If necessary, ask your doctor for a referral to an occupational therapist. He or she can help you brainstorm other fall-prevention strategies. Some solutions are easily installed and relatively inexpensive. Others may require professional help or a larger investment. If you're concerned about the cost, remember that an investment in fall prevention is an investment in your independence.